# Patient Record
Sex: MALE | Race: WHITE
[De-identification: names, ages, dates, MRNs, and addresses within clinical notes are randomized per-mention and may not be internally consistent; named-entity substitution may affect disease eponyms.]

---

## 2020-07-16 VITALS
BODY MASS INDEX: 31.07 KG/M2 | DIASTOLIC BLOOD PRESSURE: 100 MMHG | HEIGHT: 70 IN | SYSTOLIC BLOOD PRESSURE: 148 MMHG | WEIGHT: 217 LBS

## 2020-07-16 RX ORDER — NEOMYCIN SULFATE, POLYMYXIN B SULFATE, AND DEXAMETHASONE 3.5; 10000; 1 MG/G; [USP'U]/G; MG/G
OINTMENT OPHTHALMIC 4 TIMES DAILY
COMMUNITY

## 2020-07-16 RX ORDER — NAPROXEN 500 MG/1
500 TABLET ORAL 2 TIMES DAILY WITH MEALS
COMMUNITY

## 2022-06-16 ENCOUNTER — NURSE TRIAGE (OUTPATIENT)
Dept: OTHER | Facility: CLINIC | Age: 52
End: 2022-06-16

## 2022-06-16 NOTE — TELEPHONE ENCOUNTER
Received call from Enoch Mendoza at [facility] with Red Flag Complaint. Subjective: Caller states \"Swelling in neck. Swelling is above right clavicle bone. It does not hurt. Has a reddish discoloration closer to neck size of pencil eraser. \"     Current Symptoms: sized of goofball on right side clavicle area. Feels mushy. Onset: 2 days ago; sudden    Associated Symptoms: NA    Pain Severity: 0/10; n/a; n/a    Temperature: patient deniesby unknown method    What has been tried: nothing yet    LMP: NA Pregnant: NA    Recommended disposition: Go to [facility] Now    Care advice provided, patient verbalizes understanding; denies any other questions or concerns; instructed to call back for any new or worsening symptoms. Patient/Caller agrees with recommended disposition; writer provided warm transfer to Vilma Robles at [facility] for appointment scheduling    Attention Provider: Thank you for allowing me to participate in the care of your patient. The patient was connected to triage in response to information provided to the [facility]. Please do not respond through this encounter as the response is not directed to a shared pool.         Reason for Disposition   Swelling of lymph node suspected   Single large node and size > 1 inch (2.5 cm)    Protocols used: SKIN LUMP OR LOCALIZED SWELLING-ADULT-OH, LYMPH NODES - SWOLLEN-ADULT-OH